# Patient Record
Sex: MALE | Race: WHITE
[De-identification: names, ages, dates, MRNs, and addresses within clinical notes are randomized per-mention and may not be internally consistent; named-entity substitution may affect disease eponyms.]

---

## 2018-02-17 ENCOUNTER — HOSPITAL ENCOUNTER (EMERGENCY)
Dept: HOSPITAL 45 - C.EDB | Age: 23
Discharge: HOME | End: 2018-02-17
Payer: COMMERCIAL

## 2018-02-17 VITALS
HEIGHT: 70 IN | WEIGHT: 176.59 LBS | BODY MASS INDEX: 25.28 KG/M2 | HEIGHT: 70 IN | WEIGHT: 176.59 LBS | BODY MASS INDEX: 25.28 KG/M2

## 2018-02-17 VITALS — SYSTOLIC BLOOD PRESSURE: 110 MMHG | OXYGEN SATURATION: 99 % | HEART RATE: 88 BPM | DIASTOLIC BLOOD PRESSURE: 52 MMHG

## 2018-02-17 VITALS — TEMPERATURE: 98.24 F

## 2018-02-17 DIAGNOSIS — S82.891A: Primary | ICD-10-CM
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
## 2018-02-17 NOTE — DIAGNOSTIC IMAGING REPORT
R ANKLE MIN 3 VIEWS ROUTINE



CLINICAL HISTORY: twisted ankle, eval fx    



COMPARISON: None



FINDINGS:  A 3 mm ossific/calcific density distal to the fibular tip is age

indeterminate although probably old. No definite acute fracture is present.

Talar dome is intact. There is no ankle mortise widening.



IMPRESSION: 



1. No definite acute fracture.



2. 3 mm ossific/calcific density distal to the fibular tip which represents an

age indeterminate but likely old avulsed bone fracture.







Electronically signed by:  Grupo Marsh M.D.

2/17/2018 7:57 PM



Dictated Date/Time:  2/17/2018 7:56 PM

## 2018-02-17 NOTE — EMERGENCY ROOM VISIT NOTE
ED Visit Note


First contact with patient:  18:28


CHIEF COMPLAINT:  Ankle pain 








HISTORY OF PRESENT ILLNESS:  This 22-year-old male patient presents to the 

emergency department after sustaining an injury to the right ankle and foot 

with a twisting, inversion motion that occurred yesterday afternoon.  The 

patient states that he was walking and he slipped on some ice, states he felt a 

pop in his ankle.  The patient complains of pain along the outside of the 

ankle.  The patient denies any pain of the foot.  The patient rates the pain as 

throbbing and 4/10.  The patient is not able to bear weight on the foot.  

Constant pain, worse with movement, weight bearing, and the dependent position.

  No knee pain, the patient is able to move their toes.  No numbness or 

weakness of the foot, no laceration.  The patient has not had a previous 

fracture to this ankle.  The patient has taken no medications for the pain.  

The patient denies any other injury.








REVIEW OF SYSTEMS:   A 6 system review of systems was completed with positives 

and pertinent negatives listed in the HPI. 








ALLERGIES: No known allergies








MEDICATIONS:  No medications








PMH:  No significant past medical or surgical problems.








SOCIAL HISTORY:  Lives at home.  [employer] student. Denies tobacco use, alcohol

, or illicit drug use. 








PHYSICAL EXAM: Vital Signs: Reviewed Nurse's notes, vital signs stable.  GENERAL

:  Alert, pleasant and cooperative, no acute distress, but appears in pain, well

-developed, well-nourished.  MENTAL STATUS: Alert, oriented to person place and 

time, and cooperative.  MUSCULOSKELETAL: The right ankle is swollen and tender 

over the lateral malleolus, but the skin is intact and there is no ligamentous 

instability.  There is no fifth metatarsal tenderness.  There is no tenderness 

over the rest of the foot.  There is no calf or tibia/fibular tenderness.  

There is no visual deformity.  The foot and toes are warm and well-perfused.  

Dorsalis pedis pulse 2+.    Sensation to pain and light touch is intact.  

Capillary refill less than 2 seconds.








EMERGENCY DEPARTMENT COURSE:  I examined the patient.  Differential diagnosis 

includes ankle sprain, strain, contusion, fracture, dislocation, among others..

  X-rays of the right ankle were reviewed by myself and read by radiology and 

reveal no acute fibular or tibia fracture, small avulsion fracture noted of the 

distal fibula consistent with patient's area of tenderness on exam.  Gel ankle 

splint was applied to the ankle under my direction and the position was 

satisfactory.  Neurovascular status was rechecked and intact.  The patient was 

instructed on the use of crutches.  Patient was instructed to follow-up with 

orthopedic surgery for further management of his ankle injury.  The patient was 

discharged home in good condition.


Current/Historical Medications


Scheduled


Multivitamins/Minerals (Mvi With Minerals), 1 TAB PO DAILY





Allergies


Coded Allergies:  


     No Known Allergies (Unverified , 2/17/18)





Vital Signs











  Date Time  Temp Pulse Resp B/P (MAP) Pulse Ox O2 Delivery O2 Flow Rate FiO2


 


2/17/18 20:59  88 18 110/52 99   


 


2/17/18 18:25 36.8 99 18 119/71 96 Room Air  











Medications Administered











 Medications


  (Trade)  Dose


 Ordered  Sig/Osvaldo


 Route  Start Time


 Stop Time Status Last Admin


Dose Admin


 


 Ibuprofen


  (Motrin Tab)  600 mg  NOW  STAT


 PO  2/17/18 18:49


 2/17/18 18:50 DC 2/17/18 19:00


600 MG











Departure Information


Impression





 Primary Impression:  


 Right ankle sprain


 Additional Impression:  


 Closed avulsion fracture of right ankle





Dispostion


Home / Self-Care





Condition


GOOD





Referrals


No Doctor, Assigned (PCP)








Catracho Martinez D.O.





Patient Instructions


ED Crutch Walking, ED Sprain Ankle, Pending sale to [facility]





Additional Instructions





You were evaluated and treated in the emergency department today for your right 

ankle sprain.





Apply ice and keep the ankle elevated as much as possible for the next 2 days.  





Use the crutches to avoid any weightbearing on her right ankle and keep the 

splint on except for showers.  





You may take ibuprofen 600 mg every 6-8 hours and/or Tylenol 1000 mg every 8 

hours as needed for pain.  For best results, you may alternate between these 2 

medications every 3-4 hours.  





You have been provided with the contact information for an orthopedic surgeon.  

You should follow-up with them in the next 5-7 days for further evaluation of 

your ankle sprain.  Call for an appointment.





Please return to the emergency department for severe worsening pain, swelling, 

discoloration or loss of feeling in the foot, or any other concerns.





School Instructions


Additional School Instructions:  


Must use crutches and no weightbearing on the right foot/ankle until


cleared by orthopedic surgery.





Problem Qualifiers








 Primary Impression:  


 Right ankle sprain


 Encounter type:  initial encounter  Involved ligament of ankle:  unspecified 

ligament  Qualified Codes:  S93.401A - Sprain of unspecified ligament of right 

ankle, initial encounter


 Additional Impression:  


 Closed avulsion fracture of right ankle


 Encounter type:  initial encounter  Qualified Codes:  S82.891A - Other 

fracture of right lower leg, initial encounter for closed fracture